# Patient Record
Sex: FEMALE
[De-identification: names, ages, dates, MRNs, and addresses within clinical notes are randomized per-mention and may not be internally consistent; named-entity substitution may affect disease eponyms.]

---

## 2017-04-09 ENCOUNTER — RX ONLY (RX ONLY)
Age: 57
End: 2017-04-09

## 2017-04-17 ENCOUNTER — APPOINTMENT (OUTPATIENT)
Age: 57
Setting detail: DERMATOLOGY
End: 2017-04-18

## 2017-04-17 VITALS — WEIGHT: 145 LBS | HEIGHT: 67 IN

## 2017-04-17 DIAGNOSIS — L73.2 HIDRADENITIS SUPPURATIVA: ICD-10-CM

## 2017-04-17 DIAGNOSIS — F17.200 NICOTINE DEPENDENCE, UNSPECIFIED, UNCOMPLICATED: ICD-10-CM

## 2017-04-17 DIAGNOSIS — L92.0 GRANULOMA ANNULARE: ICD-10-CM

## 2017-04-17 PROCEDURE — 99406 BEHAV CHNG SMOKING 3-10 MIN: CPT

## 2017-04-17 PROCEDURE — OTHER SMOKING CESSATION COUNSELING: OTHER

## 2017-04-17 PROCEDURE — OTHER HUMIRA INITIATION: OTHER

## 2017-04-17 PROCEDURE — OTHER TREATMENT REGIMEN: OTHER

## 2017-04-17 PROCEDURE — OTHER MIPS QUALITY: OTHER

## 2017-04-17 PROCEDURE — OTHER COUNSELING: OTHER

## 2017-04-17 PROCEDURE — OTHER PRESCRIPTION: OTHER

## 2017-04-17 PROCEDURE — OTHER ORDER TESTS: OTHER

## 2017-04-17 PROCEDURE — 99203 OFFICE O/P NEW LOW 30 MIN: CPT | Mod: 25

## 2017-04-17 PROCEDURE — OTHER FOLLOW UP FOR NEXT VISIT: OTHER

## 2017-04-17 RX ORDER — CLOBETASOL PROPIONATE 0.5 MG/G
THIN COAT CREAM TOPICAL BID
Qty: 1 | Refills: 2 | Status: ERX | COMMUNITY
Start: 2017-04-17 | End: 2017-06-10

## 2017-04-17 RX ORDER — DOXYCYCLINE HYCLATE 100 MG/1
100 MG CAPSULE, GELATIN COATED ORAL BID
Qty: 60 | Refills: 2 | Status: ERX | COMMUNITY
Start: 2017-11-09

## 2017-04-17 RX ORDER — VARENICLINE TARTRATE 1 MG/1
1 PILL TABLET, FILM COATED ORAL DAILY
Qty: 1 | Refills: 3 | Status: ERX | COMMUNITY
Start: 2017-04-17 | End: 2017-06-10

## 2017-04-17 RX ORDER — VARENICLINE TARTRATE 0.5 (11)-1
AS DIRECTED KIT ORAL DAILY
Qty: 1 | Refills: 0 | Status: ERX | COMMUNITY
Start: 2017-04-17

## 2017-04-17 ASSESSMENT — SEVERITY ASSESSMENT: SEVERITY: MILD

## 2017-04-17 ASSESSMENT — BSA RASH: BSA RASH: 5

## 2017-04-17 NOTE — PROCEDURE: TREATMENT REGIMEN
Detail Level: Detailed
Initiate Treatment: Paperwork for Humira , doxycycline
Otc Regimen: Dial soap
Plan: Long discussion with patient regarding the nature of this disease. I cannot tell her why this started so late in life but it is an aggressive and difficult thing to treat. Requiring long-term treatment. Stopping smoking is pivotal so we will start her on Chantix as well. She will need a screening PPD and screening bloodwork as her  was diagnosed with hepatitis C in the past and was treated for this disease. She has never been screened for this problem. She has no pre-existing history of hepatitis or tuberculosis. She also has no known other autoimmune diseases however we will check her lab work also screening for diabetes and thyroid disorder. She will need to start with a primary care physician at some point

## 2017-04-17 NOTE — PROCEDURE: MIPS QUALITY
Quality 47: Advance Care Plan: Advance care planning not documented, reason not otherwise specified.
Quality 111:Pneumonia Vaccination Status For Older Adults: Pneumococcal Vaccination not Administered or Previously Received, Reason not Otherwise Specified
Detail Level: Detailed
Quality 110: Preventive Care And Screening: Influenza Immunization: Influenza Immunization Ordered or Recommended, but not Administered

## 2017-04-18 ENCOUNTER — RX ONLY (RX ONLY)
Age: 57
End: 2017-04-18

## 2017-04-18 RX ORDER — FLUCONAZOLE 150 MG/1
150MG TABLET ORAL PRN
Qty: 6 | Refills: 0 | Status: ERX | COMMUNITY
Start: 2017-04-18

## 2017-04-27 ENCOUNTER — RX ONLY (RX ONLY)
Age: 57
End: 2017-04-27

## 2017-04-27 RX ORDER — PREDNISONE 10 MG/1
10MG TABLET ORAL DAILY
Qty: 50 | Refills: 0 | Status: ERX | COMMUNITY
Start: 2017-04-27

## 2017-07-10 ENCOUNTER — APPOINTMENT (OUTPATIENT)
Age: 57
Setting detail: DERMATOLOGY
End: 2017-07-11

## 2017-07-10 DIAGNOSIS — L92.0 GRANULOMA ANNULARE: ICD-10-CM

## 2017-07-10 DIAGNOSIS — L73.2 HIDRADENITIS SUPPURATIVA: ICD-10-CM

## 2017-07-10 PROBLEM — L85.3 XEROSIS CUTIS: Status: ACTIVE | Noted: 2017-07-10

## 2017-07-10 PROCEDURE — OTHER PRESCRIPTION: OTHER

## 2017-07-10 PROCEDURE — OTHER FOLLOW UP FOR NEXT VISIT: OTHER

## 2017-07-10 PROCEDURE — 99213 OFFICE O/P EST LOW 20 MIN: CPT

## 2017-07-10 PROCEDURE — OTHER HUMIRA MONITORING: OTHER

## 2017-07-10 PROCEDURE — OTHER COUNSELING: OTHER

## 2017-07-10 PROCEDURE — OTHER OTHER: OTHER

## 2017-07-10 PROCEDURE — OTHER TREATMENT REGIMEN: OTHER

## 2017-07-10 RX ORDER — TRIAMCINOLONE ACETONIDE 1 MG/G
THIN COAT CREAM TOPICAL BID
Qty: 1 | Refills: 1 | Status: ERX | COMMUNITY
Start: 2017-07-10

## 2017-07-10 ASSESSMENT — SEVERITY ASSESSMENT: SEVERITY: ALMOST CLEAR

## 2017-07-10 ASSESSMENT — BSA RASH: BSA RASH: 5

## 2017-07-10 NOTE — PROCEDURE: OTHER
Detail Level: Zone
Note Text (......Xxx Chief Complaint.): This diagnosis correlates with the
Other (Free Text): Check labs at follow up visit. I did review her previous labs with her, highlighting the fact that there is some indication she may have hyperthyroidism and diabetes. Regarding her hepatitis studies she is hepatitis C negative but she is not immune to hepatitis B however, she has no known risk factors for kristina hepatitis B

## 2017-07-10 NOTE — PROCEDURE: TREATMENT REGIMEN
Detail Level: Simple
Otc Regimen: Dial soap
Plan: Continue the Humira 40 mg weekly however, she is going to try to give an injection every 10 days to see if this helps with the migraine headaches that often happen one day after her injection. She may even be able to extend the injection to every two weeks but I cannot promise that this will help control the HS. Regarding her smoking she has restarted because she is unable to afford the Chantix since losing her insurance but hopefully in the next 60 days she will be insured yet again. Regarding her hyperthyroidism and diabetes she has not yet seen a primary care physician again because of the loss of insurance that she is aware of the need to follow up. She does have some signs of hyperthyroidism with anxiety, restlessness, and occasional palpitations but no associated chest pain or shortness of breath, no weight loss
Plan: The areas are fading and resolving however she is no longer able to continue the clobetasol due to loss of insurance. I will send in a much more affordable topical steroid that will hopefully work as well, patient can call if any problems

## 2017-07-10 NOTE — PROCEDURE: HUMIRA MONITORING
Detail Level: Zone
Comments: Patient is doing well, medication is controlling the problem. She is having some side effects as mentioned however they do not seem to be dangerous or life-threatening. She is going to try to extend the injection frequency to see if this helps the migraines and the abdominal pain. She may also try to inject herself in the upper leg area
Length Of Therapy: 2 months

## 2017-11-09 ENCOUNTER — APPOINTMENT (OUTPATIENT)
Age: 57
Setting detail: DERMATOLOGY
End: 2017-11-10

## 2017-11-09 DIAGNOSIS — R79.89 OTHER SPECIFIED ABNORMAL FINDINGS OF BLOOD CHEMISTRY: ICD-10-CM

## 2017-11-09 DIAGNOSIS — L03.01 CELLULITIS OF FINGER: ICD-10-CM

## 2017-11-09 DIAGNOSIS — L73.2 HIDRADENITIS SUPPURATIVA: ICD-10-CM

## 2017-11-09 PROBLEM — L03.011 CELLULITIS OF RIGHT FINGER: Status: ACTIVE | Noted: 2017-11-09

## 2017-11-09 PROCEDURE — OTHER HUMIRA MONITORING: OTHER

## 2017-11-09 PROCEDURE — OTHER FOLLOW UP FOR NEXT VISIT: OTHER

## 2017-11-09 PROCEDURE — OTHER COUNSELING: OTHER

## 2017-11-09 PROCEDURE — OTHER TREATMENT REGIMEN: OTHER

## 2017-11-09 PROCEDURE — OTHER PRESCRIPTION: OTHER

## 2017-11-09 PROCEDURE — OTHER OTHER: OTHER

## 2017-11-09 ASSESSMENT — LOCATION ZONE DERM: LOCATION ZONE: FINGER

## 2017-11-09 ASSESSMENT — LOCATION DETAILED DESCRIPTION DERM: LOCATION DETAILED: RIGHT PROXIMAL PALMAR MIDDLE FINGER

## 2017-11-09 ASSESSMENT — LOCATION SIMPLE DESCRIPTION DERM: LOCATION SIMPLE: RIGHT MIDDLE FINGER

## 2017-11-09 NOTE — PROCEDURE: OTHER
Other (Free Text): Check labs with PCP. I did review her previous labs with her, highlighting the fact that there is some indication she may have hyperthyroidism and diabetes
Note Text (......Xxx Chief Complaint.): This diagnosis correlates with the
Detail Level: Zone

## 2017-11-09 NOTE — PROCEDURE: TREATMENT REGIMEN
Detail Level: Zone
Plan: Patient has had this infection before, she has been to the emergency room to have incision and drainage in the past but is unwilling to do so now. I counseled her that this could be a dangerous problem as it could cause destruction or damage to some of the more vital tendons in the finger. I will put her back on doxycycline 100 mg twice daily but we will need to call and check in with her in one week to see how she is doing. If no improvement I would change to Bactrim
Otc Regimen: Dial soap
Detail Level: Simple
Plan: Referred to [facility] for fu with elevated glucose and possible hyperthyroidism. Regarding her hyperthyroidism and diabetes she has not yet seen a primary care physician again because of the loss of insurance that she is aware of the need to follow up. She does have some signs of hyperthyroidism with anxiety, restlessness, and occasional palpitations but no associated chest pain or shortness of breath, no weight loss
Plan: Continue the Humira 40 mg every 9 days. Regarding her smoking she has restarted because she is unable to afford the Chantix since losing her insurance but hopefully in the next 60 days she will be insured yet again

## 2017-11-09 NOTE — PROCEDURE: HUMIRA MONITORING
Detail Level: Zone
Comments: Patient is doing well on the medication it seems to be controlling her problem for the most part. She does get occasional inflammation but no infection, or drainage. She is tolerating the medication as long as she takes it every nine days. Otherwise no side effects from the Humira
Length Of Therapy: 6 months

## 2017-11-10 RX ORDER — DOXYCYCLINE HYCLATE 100 MG/1
100 MG CAPSULE, GELATIN COATED ORAL BID
Qty: 60 | Refills: 1 | Status: ERX

## 2018-06-14 ENCOUNTER — APPOINTMENT (OUTPATIENT)
Age: 58
Setting detail: DERMATOLOGY
End: 2018-06-15

## 2018-06-14 VITALS — HEIGHT: 66 IN

## 2018-06-14 DIAGNOSIS — L73.2 HIDRADENITIS SUPPURATIVA: ICD-10-CM

## 2018-06-14 PROBLEM — J30.1 ALLERGIC RHINITIS DUE TO POLLEN: Status: ACTIVE | Noted: 2018-06-14

## 2018-06-14 PROCEDURE — OTHER TREATMENT REGIMEN: OTHER

## 2018-06-14 PROCEDURE — OTHER MIPS QUALITY: OTHER

## 2018-06-14 PROCEDURE — OTHER ORDER TESTS: OTHER

## 2018-06-14 PROCEDURE — OTHER COUNSELING: OTHER

## 2018-06-14 PROCEDURE — OTHER FOLLOW UP FOR NEXT VISIT: OTHER

## 2018-06-14 PROCEDURE — 99213 OFFICE O/P EST LOW 20 MIN: CPT

## 2018-06-14 PROCEDURE — OTHER PRESCRIPTION: OTHER

## 2018-06-14 PROCEDURE — OTHER OTHER: OTHER

## 2018-06-14 PROCEDURE — OTHER HUMIRA MONITORING: OTHER

## 2018-06-14 RX ORDER — DOXYCYCLINE HYCLATE 100 MG/1
100 MG CAPSULE, GELATIN COATED ORAL BID
Qty: 60 | Refills: 2 | Status: ERX

## 2018-06-14 ASSESSMENT — HURLEY STAGE
IN YOUR EXPERIENCE, AMONG ALL PATIENTS YOU HAVE SEEN WITH THIS CONDITION, HOW SEVERE IS THIS PATIENT'S CONDITION?: HURLEY STAGE II: SINGLE OR MULTIPLE, WIDELY SEPARATED RECURRENT ABSCESSES WITH SINUS TRACT FORMATION AND SCARRING

## 2018-06-14 NOTE — PROCEDURE: TREATMENT REGIMEN
Plan: Continue the Humira 40 mg every 9 days, headaches are improved with the longer treatment dosing. She does continue to smoke but is considering options to try to quit. We will also send in a refill of her doxycycline as she will use this periodically to reduce inflammation. She will call or follow up if needed, follow up with me in six months. If a new prescription for Humira is needed she will let us know. Pt needs repeat labs, last set was 1 year ago
Otc Regimen: Dial soap
Detail Level: Simple

## 2018-06-14 NOTE — PROCEDURE: OTHER
Detail Level: Simple
Other (Free Text): Regarding the abnormal labs from the last set of bloodwork, she still has not followed up with her primary care physician but I emphasized her need to check with her primary care doctor in order to discuss the findings from the elevated blood glucose and the possible hyperthyroidism. Will repeat labs now
Note Text (......Xxx Chief Complaint.): This diagnosis correlates with the

## 2018-06-14 NOTE — PROCEDURE: HUMIRA MONITORING
Comments: Patient is doing well on the medication it seems to be controlling her problem for the most part. She does get occasional inflammation but no infection, or drainage. She is tolerating the medication as long as she takes it every nine days. Otherwise no side effects from the Humira
Detail Level: Zone
Length Of Therapy: 1 year
